# Patient Record
Sex: MALE | Race: OTHER
[De-identification: names, ages, dates, MRNs, and addresses within clinical notes are randomized per-mention and may not be internally consistent; named-entity substitution may affect disease eponyms.]

---

## 2019-09-09 ENCOUNTER — HOSPITAL ENCOUNTER (INPATIENT)
Dept: HOSPITAL 1 - ED | Age: 84
LOS: 2 days | Discharge: HOME HEALTH | DRG: 190 | End: 2019-09-11
Attending: INTERNAL MEDICINE | Admitting: INTERNAL MEDICINE
Payer: COMMERCIAL

## 2019-09-09 VITALS
HEIGHT: 72 IN | BODY MASS INDEX: 19 KG/M2 | WEIGHT: 140.31 LBS | BODY MASS INDEX: 19 KG/M2 | HEIGHT: 72 IN | WEIGHT: 140.31 LBS

## 2019-09-09 VITALS — SYSTOLIC BLOOD PRESSURE: 132 MMHG | DIASTOLIC BLOOD PRESSURE: 76 MMHG

## 2019-09-09 VITALS — SYSTOLIC BLOOD PRESSURE: 151 MMHG | DIASTOLIC BLOOD PRESSURE: 70 MMHG

## 2019-09-09 VITALS — DIASTOLIC BLOOD PRESSURE: 75 MMHG | SYSTOLIC BLOOD PRESSURE: 129 MMHG

## 2019-09-09 DIAGNOSIS — E78.00: ICD-10-CM

## 2019-09-09 DIAGNOSIS — J18.9: ICD-10-CM

## 2019-09-09 DIAGNOSIS — I50.9: ICD-10-CM

## 2019-09-09 DIAGNOSIS — I48.91: ICD-10-CM

## 2019-09-09 DIAGNOSIS — E86.0: ICD-10-CM

## 2019-09-09 DIAGNOSIS — J44.1: ICD-10-CM

## 2019-09-09 DIAGNOSIS — E11.9: ICD-10-CM

## 2019-09-09 DIAGNOSIS — I11.0: ICD-10-CM

## 2019-09-09 DIAGNOSIS — J45.901: ICD-10-CM

## 2019-09-09 DIAGNOSIS — J44.0: Primary | ICD-10-CM

## 2019-09-09 DIAGNOSIS — Z87.01: ICD-10-CM

## 2019-09-09 LAB
ALBUMIN SERPL-MCNC: 3.4 G/DL (ref 3.4–5)
ALP SERPL-CCNC: 71 U/L (ref 46–116)
ALT SERPL-CCNC: 17 U/L (ref 16–63)
AST SERPL-CCNC: 18 U/L (ref 15–37)
BASOPHILS NFR BLD: 0.3 % (ref 0–2)
BILIRUB SERPL-MCNC: 1 MG/DL (ref 0.2–1)
BUN SERPL-MCNC: 20 MG/DL (ref 7–18)
CALCIUM SERPL-MCNC: 9.5 MG/DL (ref 8.5–10.1)
CHLORIDE SERPL-SCNC: 101 MMOL/L (ref 98–107)
CO2 SERPL-SCNC: 29.9 MMOL/L (ref 21–32)
CREAT SERPL-MCNC: 0.8 MG/DL (ref 0.7–1.3)
ERYTHROCYTE [DISTWIDTH] IN BLOOD BY AUTOMATED COUNT: 14.5 % (ref 11.5–14.5)
GFR SERPLBLD BASED ON 1.73 SQ M-ARVRAT: (no result) ML/MIN
GLUCOSE SERPL-MCNC: 132 MG/DL (ref 74–106)
MICROSCOPIC UR-IMP: YES
PLATELET # BLD: 248 X10^3MCL (ref 130–400)
POTASSIUM SERPL-SCNC: 4.8 MMOL/L (ref 3.5–5.1)
PROT SERPL-MCNC: 8.6 G/DL (ref 6.4–8.2)
RBC # UR STRIP.AUTO: (no result) /UL
SODIUM SERPL-SCNC: 139 MMOL/L (ref 136–145)
T3 SERPL-MCNC: 0.83 NG/ML
T3RU NFR SERPL: 38 % UPTAKE (ref 33–39)
T4 FREE SERPL-MCNC: 1.23 NG/DL (ref 0.76–1.46)
T4 SERPL-MCNC: 6.5 UG/DL (ref 4.7–13.3)
T4/T3 UPTAKE INDEX SERPL: 2.5 UG/DL (ref 1.4–4.5)
UA SPECIFIC GRAVITY: 1.01 (ref 1–1.03)

## 2019-09-09 PROCEDURE — G0378 HOSPITAL OBSERVATION PER HR: HCPCS

## 2019-09-09 NOTE — NUR
PT [facility]-ED, TOLERATED WELL, DAUGHTER AT BEDSIDE FOR INTERRPRETATION,
PER DAUGHTER PT VERBALIZED CONSENT. URINE DIP RESULTS REPORTED TO DR REYNOLDS,
URINE SENT TO LAB FOR FURTHER TESTING.

## 2019-09-09 NOTE — NUR
PT MEDICATED PER EMAR, ON FULL CM, FAMILY AT BEDSIDE. CALL LIGHT WITHIN REACH,
IN POSITION OF COMFORT.

## 2019-09-09 NOTE — NUR
PT BIB BY DAUGHTER, PT AT BEDSIDE FOR INTERPRETATION, PT C/O INCREASING
GENERALIZED WEAKNESS, SOB, DIZZINESS AND "NOT WANTING TO TALK TO ANYBODY" X1
WEEK, PT'S DAUGHTER STS PT WAS SEEN BY PMD EARLIER TODAY AND INSTRUCTED TO COME
TO ED FOR FURTHER EVAL. PT'S SPEECH IS CLEAR, ANSWERS QUESTIONS APPRORPRIATELY,
WEAKNESS NOTED TO BIJAN UPPER AND LOWER EXTREMETIES. DR REYNOLDS COMPLETED MSE. NAD
NOTED.

## 2019-09-09 NOTE — NUR
RECEIVED PATIENT IN BED AWAKE AND ORIENTED WITH NO SIGN OF ACUTE RESPIRATORY
DISTRESS. BREATHING EASY AND NONLABOR WITH DIMINISHED BS SATTING AT 98% RA.
FAMILY MEMBERS AT BEDSIDE. ABDOMEN ROUND AND NONTENDER WITH ACTIVE BS. IV TO
RAC HEPLOCKED, FLUSHED WITH NS. WILL CONTINUE TO MONITOR. CALL LIGHT WITHIN
REACH.

## 2019-09-09 NOTE — NUR
PATIENT RECEIVED FROM ER AND HAS BEEN WITH SOB AND GENERAL WEAKNESS FOR ONE
WEEK. HE HAS NOT BEEN WALKING AND HAS NOT BEEN EATTING WELL. PATIENT IS LEAN
AND WITH NO EDEMA NOTED. HE HAS DISCOLORATION AND VARIOUS BRUISING AND SOME
SCARRING TO THE LOWER EXTREMTITIES. PATIENT HAS HEARING AID BUT NOTE WITH THE
PATIENT AND HAS DENTURES UPPER AND BOTTOM AND HAS NOT HAD THE DENTURES IN
PLACE. PATIENT OFFERED DIET IN THE ER AND IS APPARENTLY A VEGATERIAN. HE HAS
WIFE AND DAUGHTER AT BEDSIDE WHO ARE ATTENTIVE WITH CARE. LAST BLOOD SUGAR AT
THIS AT 32 AND PATIENT AHD NOTED LABS OF BUN AT 20, AND GLUCOSE , AND
SAT AT 92.3. LUNGA RE DIMINISHED AND PATIENT IS LITTLE ANXIOUS AT THIS TIME.
HE HAS BEEN AMBULATORY PRIOR THIS ILLNESS AND IT IS THE HOPE HE GETS BACK TO
HER BASELINE AGAIN. FAMILY IS TO BRING IN HIS MEDICATIONS FROM HOME.

## 2019-09-09 NOTE — NUR
RECEIVED PT FROM ED VIA [employer], CAME IN DUE TO INTERMITTENT DIZZINESS, SOB,
LOSS OF APPETITE, AND WEAKNESS. AAOX3. DENIES HEADACHE/DIZZINESS. ABLE TO
FOLLOW SIMPLE COMMANDS. NO FACIAL DROOP/ARM DRIFT NOTED. HAND  ARE WEAK
BUT EQUAL. PUPILS ARE REACTIVE TO LIGHT. NO SOB NOTED, LUNG SOUNDS DIMINISHED
ON THE BASES, O2 SAT=95%. RA. DENIES CHEST PAIN/PRESSURE. DENIES ABDOMINAL
PAIN/NAUSEA/VOMITING. PER PT'S DAUGHTER, THE PT IS CONSTIPATED. BOWEL SOUNDS
HYPOACTIVE. ABDOMEN IS SOFT/FLAT. URINE INCONTINENT. W/ DARK BROWN
DISCOLORATIONS ON BLE. IV SITE ON THE RAC GAUGE 20 IS PATENT AND INTACT. SIDE
RAILS UPX2. CALL LIGHT ON REACH. HOB ELEVATED AT 30 DEG. PT'S WIFE AND
DAUGHTER AT BEDSIDE. ENDORSED TO PRIMARY NURSE BLAKE FOR CONTINUITY OF CARE

## 2019-09-09 NOTE — NUR
PT'S DAUGHTER PROVIDED A PARTIAL LIST OF PT'S HOME MEDS. MEDICATION
RECONCILIATION DONE. DR. CALDERON IS PAGED TO MADE AWARE

## 2019-09-10 VITALS — SYSTOLIC BLOOD PRESSURE: 116 MMHG | DIASTOLIC BLOOD PRESSURE: 59 MMHG

## 2019-09-10 VITALS — SYSTOLIC BLOOD PRESSURE: 104 MMHG | DIASTOLIC BLOOD PRESSURE: 55 MMHG

## 2019-09-10 VITALS — SYSTOLIC BLOOD PRESSURE: 142 MMHG | DIASTOLIC BLOOD PRESSURE: 84 MMHG

## 2019-09-10 VITALS — SYSTOLIC BLOOD PRESSURE: 104 MMHG | DIASTOLIC BLOOD PRESSURE: 52 MMHG

## 2019-09-10 LAB
ALBUMIN SERPL-MCNC: 2.8 G/DL (ref 3.4–5)
ALP SERPL-CCNC: 57 U/L (ref 46–116)
ALT SERPL-CCNC: 15 U/L (ref 16–63)
AST SERPL-CCNC: 12 U/L (ref 15–37)
BASOPHILS NFR BLD: 0 % (ref 0–2)
BILIRUB SERPL-MCNC: 0.58 MG/DL (ref 0.2–1)
BUN SERPL-MCNC: 19 MG/DL (ref 7–18)
CALCIUM SERPL-MCNC: 8.6 MG/DL (ref 8.5–10.1)
CHLORIDE SERPL-SCNC: 106 MMOL/L (ref 98–107)
CO2 SERPL-SCNC: 26.6 MMOL/L (ref 21–32)
CREAT SERPL-MCNC: 0.8 MG/DL (ref 0.7–1.3)
ERYTHROCYTE [DISTWIDTH] IN BLOOD BY AUTOMATED COUNT: 13.9 % (ref 11.5–14.5)
GFR SERPLBLD BASED ON 1.73 SQ M-ARVRAT: (no result) ML/MIN
GLUCOSE SERPL-MCNC: 167 MG/DL (ref 74–106)
MAGNESIUM SERPL-MCNC: 1.7 MG/DL (ref 1.8–2.4)
PLATELET # BLD: 223 X10^3MCL (ref 130–400)
POTASSIUM SERPL-SCNC: 4.8 MMOL/L (ref 3.5–5.1)
PROT SERPL-MCNC: 7.3 G/DL (ref 6.4–8.2)
SODIUM SERPL-SCNC: 143 MMOL/L (ref 136–145)

## 2019-09-10 NOTE — NUR
PT STABLE RESING IN BED WITH FAMILY AT BEDSIDE. NO C/O PAIN OR DISTRESS. A/O
X3 WITH NO HA OR DIZZINESS. PT SATURATION AT 98% ON RA. RAC INTACT AND PATENT
WITH NO REDNESS OR INFLAMMATION NOTED. PT GIVEN MOM FOR CONSTIPATION. SAFETY
PRECAUTIONS IN PLACE, CALL LIGHT WITHIN REACH, WILL ENDORSE CARE TO NIGHT
NURSE.

## 2019-09-10 NOTE — NUR
PT STABLE WITH NO C/O DISTRESS OR PAIN. FAMILY AT BEDSIDE. SAFETY PRECAUTIONS
IN PLACE, CALL LIGHT WITHIN REACH, WILL MONITOR.

## 2019-09-10 NOTE — NUR
SLEPT AT LONG INTERVALS WITH NO SIGN OF PAIN AND DISCOMFORT NOTED. CHECKED AT
INTERVALS FOR NEEDS AND SAFETY.ALL NEEDS ATTENDED.

## 2019-09-10 NOTE — NUR
RECIEVED PT RESTING IN BED WITH FAMILY AT BEDSIDE. A/O X3 WITH NO HA OR
DIZZINESS. C/O CONSTIPATION, WILL NOTIFY DR MONTEIRO. IV IN RAC INTACT AND PATENT
WITH NO REDNESS OR INFLAMMATION. SAFETY PRECAUTIONS IN PLACE, CALL LIGHT
WITHIN REACH, WILL MONITOR.

## 2019-09-10 NOTE — NUR
PT STABLE WITH NO C/O DISTRESS OR PAIN. WIFE AT BEDSIDE. SAFETY PRECAUTIONS
IN PLACE, CALL LIGHT WITHIN REACH, WILL MONITOR.

## 2019-09-10 NOTE — NUR
RECEIVED PATIENT IN BED AWAKE AND ALERT WITH NO SIGN OF ACUTE RESPIRATORY
DISTRESS. BREATHING EASY AND NONLABOR SATTING AT 97% RA. ABDOMEN ROUND AND
NONTENDER WITH ACTIVE BS. IV TO RAC HEPLOCK AND FLUSHED WITH NS. WILL CONTINUE
TO MONITOR. ALL LIGHT WITHIN REACH. FAMILY MEMBERS AT BEDSIDE.

## 2019-09-11 VITALS — SYSTOLIC BLOOD PRESSURE: 126 MMHG | DIASTOLIC BLOOD PRESSURE: 66 MMHG

## 2019-09-11 VITALS — DIASTOLIC BLOOD PRESSURE: 66 MMHG | SYSTOLIC BLOOD PRESSURE: 126 MMHG

## 2019-09-11 LAB
ALBUMIN SERPL-MCNC: 2.7 G/DL (ref 3.4–5)
ALP SERPL-CCNC: 56 U/L (ref 46–116)
ALT SERPL-CCNC: 15 U/L (ref 16–63)
AST SERPL-CCNC: 15 U/L (ref 15–37)
BASOPHILS NFR BLD: 0 % (ref 0–2)
BILIRUB SERPL-MCNC: 0.5 MG/DL (ref 0.2–1)
BUN SERPL-MCNC: 23 MG/DL (ref 7–18)
CALCIUM SERPL-MCNC: 8.6 MG/DL (ref 8.5–10.1)
CHLORIDE SERPL-SCNC: 108 MMOL/L (ref 98–107)
CO2 SERPL-SCNC: 25.4 MMOL/L (ref 21–32)
CREAT SERPL-MCNC: 0.8 MG/DL (ref 0.7–1.3)
ERYTHROCYTE [DISTWIDTH] IN BLOOD BY AUTOMATED COUNT: 14.8 % (ref 11.5–14.5)
GFR SERPLBLD BASED ON 1.73 SQ M-ARVRAT: (no result) ML/MIN
GLUCOSE SERPL-MCNC: 189 MG/DL (ref 74–106)
MAGNESIUM SERPL-MCNC: 2.1 MG/DL (ref 1.8–2.4)
PLATELET # BLD: 225 X10^3MCL (ref 130–400)
POTASSIUM SERPL-SCNC: 4.7 MMOL/L (ref 3.5–5.1)
PROT SERPL-MCNC: 6.9 G/DL (ref 6.4–8.2)
SODIUM SERPL-SCNC: 142 MMOL/L (ref 136–145)

## 2019-09-11 NOTE — NUR
SLEPT AT LONG INTERVALS, NO INDICATION OF PAIN AND DISCOMFORT NOTED.
REPOSITIONED FOR COMFORT. HAD BMX1 SOFT LARGE AMOUNT. ALL NEEDS ATTENDED.

## 2019-09-11 NOTE — NUR
RECIEVED PT FROM NIGHT SHIFT NURSE. PT RESTING IN BED, AOX3, RESP E/U ON RA.
NO ACUTE DISTRESS NOTED AT THIS TIME. SALINE LOCKED TO RAC W/ NO ERYTHEMA OR
EDEMA. BED IN LOWEST POSITION AND CALL LIGHT WITHIN REACH. WILL CONTINUE TO
MONITOR.

## 2019-09-11 NOTE — NUR
PT DISCHARGED. DAUGHTER GRABIEL WAS ADVISED OF DR. MONTEIRO'S RECOMMENDATION TO STAY
ONE MORE DAY BUT OPTED OUT. REVIEWED VISIT SUMMARY, EDUCATIONAL PACKET, RX
MEDS AND FOLLOW UP INSTRUCTIONS. PT AOX3, VS STABLE, RESP E/U ON RA, DENIES
PAIN AT THIS TIME. IV TO RAC REMOVED, CATH INTACT, GAUZE DRESSING APPLIED. PT
ESCORTED TO DISCHARGE OFFICE W/ CNA PAT VIA WHEELCHAIR W/ NO ACUTE INCIDENCE.

## 2019-09-11 NOTE — NUR
SPOKE TO DR MONTEIRO ABOUT HOME HEALTH BEING ARRANGED. DR MONTEIRO STATED THAT HE
RECOMMENDS PT TO STAY ONE MORE NIGHT BECAUSE OF HIS WHEEZING BUT IF FAMILY
WANTS TO LEAVE THEN PATIENT CAN BE DISCHARGE. INFORMED THE FAMILY ABOUT DR DENNY RECOMMENDATION. FAMILY STATED THAT THEY WANT TO BE DISCHARGED AND TAKE
THE PATIENT HOME. CHARGE NURSE AND PRIMARY NURSE MADE AWARE.

## 2019-09-11 NOTE — NUR
**********PHYSICAL THERAPY DAILY NOTES CO-SIGN**********
All documentation done by the Physical Therapy Assistant for 09/11/19
has been reviewed. I agree with the documentation.
 
Reviewed/Co-Signed by: Zhanna Funez  PT
Documentation Done by: ZACHARY MANLEY PTA